# Patient Record
Sex: FEMALE | Race: WHITE
[De-identification: names, ages, dates, MRNs, and addresses within clinical notes are randomized per-mention and may not be internally consistent; named-entity substitution may affect disease eponyms.]

---

## 2017-01-30 ENCOUNTER — HOSPITAL ENCOUNTER (OUTPATIENT)
Dept: HOSPITAL 44 - LAB | Age: 72
End: 2017-01-30
Attending: FAMILY MEDICINE
Payer: MEDICARE

## 2017-01-30 DIAGNOSIS — E03.9: Primary | ICD-10-CM

## 2017-01-30 PROCEDURE — 84443 ASSAY THYROID STIM HORMONE: CPT

## 2017-01-30 PROCEDURE — 36415 COLL VENOUS BLD VENIPUNCTURE: CPT

## 2017-02-17 ENCOUNTER — HOSPITAL ENCOUNTER (OUTPATIENT)
Dept: HOSPITAL 44 - RHEU | Age: 72
End: 2017-02-17
Attending: INTERNAL MEDICINE
Payer: MEDICARE

## 2017-02-17 DIAGNOSIS — M35.00: Primary | ICD-10-CM

## 2017-02-17 PROCEDURE — 99214 OFFICE O/P EST MOD 30 MIN: CPT

## 2017-02-17 NOTE — OP CLINIC PROGRESS NOTE
REFERRING PHYSICIAN:

Dr. Erendira Liang



Dear Dr. Liang:

 

REASON FOR VISIT: 

I had the pleasure of seeing Leena Faust in follow up.  She is not having 
significant discomfort.  She still has some joint pain and stiffness and a rash 
on occasion.  Otherwise, no fever, chills, or sweats.  She denies dry eyes or 
dry mouth.  She has had no rashes.  No numbness or tingling.  



PAST MEDICAL HISTORY:

1.   Myelodysplasia. 

2.   Neutropenia. 

3.   Anemia.  

4.   Chronic low back pain. 

5.   Hypothyroidism. 



PRIMARY CARE PROVIDERS:

She presently is under the care of:

1.    Dr. Pasha Souza. 

2.    Dr. Trace Browne. 

3.    Dr. Jelani Yousif.



PRESENT MEDICATIONS: 

Levothyroxine 75 mcg daily. 



ALLERGIES:

Allergies are again reviewed:

1.    Penicillin. 

2.    Sulfa drugs. 

3.    Erythromycin. 

4.    Vibramycin. 

5.    Tramadol. 

6.    Cefuroxime. 

7.    Allegra. 

8.    Osteo-Bi-Flex.

9.    Alpha-lipoic acid.

10.  Aspirin. 



SOCIAL HISTORY AND FAMILY HISTORY: 

Social history and family history are unchanged. 



PHYSICAL EXAMINATION: 

GENERAL:   She looks well and in no distress. 

VITAL SIGNS:  T: 97.3, R: 12, heart rate of 70, BP: 170/70.

HEENT:   No parotid or submandibular swelling.  No stomatitis or glossitis.  No 
cervical nodes.

LUNGS:  Clear bilaterally. 

HEART:   Regular rhythm.

ABDOMEN:   Soft and nontender. 

VASCULAR:   No edema or cyanosis. 

JOINTS:   DIPs and PIPs with hard bony swelling.  No synovitis at the MCPs, 
wrists, or elbows. 



DIAGNOSTIC STUDIES:

Review of her labs show that LALIT came back at 1:640 in a speckled pattern.  JOSE J
/IgG over 150.  SS-A antibody over 240.  SS-B antibody at 30.  IgM rheumatoid 
factor of 41.  IgA rheumatoid factor of 208.  CCP antibody and MCV antibody 
negative.  Thyroglobulin antibody is equivocal.  Anti-cardiolipin antibodies 
were negative.  



IMPRESSION AND PLAN: 

I suspect this lady has Sjogren's syndrome.  I do not have all the data.  If 
there is a concern that her hemological abnormalities are autoimmune, we could 
consider a steroid trial.   I also will be keeping Dr. Souza, Dr. Browne, and 
Dr. Yousif involved.  



Thank you very much for the opportunity to care for your patient.



Best regards, 





cc:    Dr. Azul Yousif



Enclosure:    

Avise Test



    

MTDD

## 2018-01-05 ENCOUNTER — HOSPITAL ENCOUNTER (OUTPATIENT)
Dept: HOSPITAL 44 - LAB | Age: 73
End: 2018-01-05
Attending: FAMILY MEDICINE
Payer: MEDICARE

## 2018-01-05 DIAGNOSIS — E03.9: Primary | ICD-10-CM

## 2018-01-05 PROCEDURE — 84443 ASSAY THYROID STIM HORMONE: CPT

## 2018-01-05 PROCEDURE — 36415 COLL VENOUS BLD VENIPUNCTURE: CPT

## 2018-12-06 ENCOUNTER — HOSPITAL ENCOUNTER (OUTPATIENT)
Dept: HOSPITAL 44 - LAB | Age: 73
End: 2018-12-06
Attending: FAMILY MEDICINE
Payer: MEDICARE

## 2018-12-06 DIAGNOSIS — E03.9: Primary | ICD-10-CM

## 2018-12-06 PROCEDURE — 36415 COLL VENOUS BLD VENIPUNCTURE: CPT

## 2018-12-06 PROCEDURE — 84443 ASSAY THYROID STIM HORMONE: CPT

## 2019-02-25 ENCOUNTER — HOSPITAL ENCOUNTER (OUTPATIENT)
Dept: HOSPITAL 44 - LAB | Age: 74
End: 2019-02-25
Attending: ORTHOPAEDIC SURGERY
Payer: MEDICARE

## 2019-02-25 DIAGNOSIS — Z96.649: ICD-10-CM

## 2019-02-25 DIAGNOSIS — Z79.01: Primary | ICD-10-CM

## 2019-02-25 DIAGNOSIS — Z96.659: ICD-10-CM

## 2019-02-25 PROCEDURE — 36415 COLL VENOUS BLD VENIPUNCTURE: CPT

## 2019-02-25 PROCEDURE — 85610 PROTHROMBIN TIME: CPT

## 2019-02-28 ENCOUNTER — HOSPITAL ENCOUNTER (OUTPATIENT)
Dept: HOSPITAL 44 - LAB | Age: 74
End: 2019-02-28
Attending: ORTHOPAEDIC SURGERY
Payer: MEDICARE

## 2019-02-28 DIAGNOSIS — Z96.649: ICD-10-CM

## 2019-02-28 DIAGNOSIS — Z96.659: ICD-10-CM

## 2019-02-28 DIAGNOSIS — Z79.01: Primary | ICD-10-CM

## 2019-02-28 PROCEDURE — 85610 PROTHROMBIN TIME: CPT

## 2019-02-28 PROCEDURE — 36415 COLL VENOUS BLD VENIPUNCTURE: CPT

## 2019-03-04 ENCOUNTER — HOSPITAL ENCOUNTER (OUTPATIENT)
Dept: HOSPITAL 44 - LAB | Age: 74
End: 2019-03-04
Attending: ORTHOPAEDIC SURGERY
Payer: MEDICARE

## 2019-03-04 DIAGNOSIS — Z79.01: Primary | ICD-10-CM

## 2019-03-04 DIAGNOSIS — Z96.659: ICD-10-CM

## 2019-03-04 DIAGNOSIS — Z96.649: ICD-10-CM

## 2019-03-04 PROCEDURE — 36415 COLL VENOUS BLD VENIPUNCTURE: CPT

## 2019-03-04 PROCEDURE — 85610 PROTHROMBIN TIME: CPT

## 2019-03-07 ENCOUNTER — HOSPITAL ENCOUNTER (OUTPATIENT)
Dept: HOSPITAL 44 - LAB | Age: 74
End: 2019-03-07
Attending: ORTHOPAEDIC SURGERY
Payer: MEDICARE

## 2019-03-07 DIAGNOSIS — Z96.649: ICD-10-CM

## 2019-03-07 DIAGNOSIS — Z96.659: ICD-10-CM

## 2019-03-07 DIAGNOSIS — Z79.01: Primary | ICD-10-CM

## 2019-03-07 PROCEDURE — 36415 COLL VENOUS BLD VENIPUNCTURE: CPT

## 2019-03-07 PROCEDURE — 85610 PROTHROMBIN TIME: CPT

## 2019-03-11 ENCOUNTER — HOSPITAL ENCOUNTER (OUTPATIENT)
Dept: HOSPITAL 44 - LAB | Age: 74
End: 2019-03-11
Attending: ORTHOPAEDIC SURGERY
Payer: MEDICARE

## 2019-03-11 DIAGNOSIS — Z96.649: ICD-10-CM

## 2019-03-11 DIAGNOSIS — Z79.01: Primary | ICD-10-CM

## 2019-03-11 DIAGNOSIS — Z96.659: ICD-10-CM

## 2019-03-11 PROCEDURE — 36415 COLL VENOUS BLD VENIPUNCTURE: CPT

## 2019-03-11 PROCEDURE — 85610 PROTHROMBIN TIME: CPT

## 2019-03-14 ENCOUNTER — HOSPITAL ENCOUNTER (OUTPATIENT)
Dept: HOSPITAL 44 - LAB | Age: 74
End: 2019-03-14
Attending: ORTHOPAEDIC SURGERY
Payer: MEDICARE

## 2019-03-14 DIAGNOSIS — Z96.649: ICD-10-CM

## 2019-03-14 DIAGNOSIS — Z96.659: ICD-10-CM

## 2019-03-14 DIAGNOSIS — Z79.01: Primary | ICD-10-CM

## 2019-03-14 PROCEDURE — 85610 PROTHROMBIN TIME: CPT

## 2019-03-14 PROCEDURE — 36415 COLL VENOUS BLD VENIPUNCTURE: CPT

## 2019-03-18 ENCOUNTER — HOSPITAL ENCOUNTER (OUTPATIENT)
Dept: HOSPITAL 44 - LAB | Age: 74
End: 2019-03-18
Attending: ORTHOPAEDIC SURGERY
Payer: MEDICARE

## 2019-03-18 DIAGNOSIS — Z96.649: ICD-10-CM

## 2019-03-18 DIAGNOSIS — Z96.659: ICD-10-CM

## 2019-03-18 DIAGNOSIS — Z79.01: Primary | ICD-10-CM

## 2019-03-18 PROCEDURE — 85610 PROTHROMBIN TIME: CPT

## 2019-03-18 PROCEDURE — 36415 COLL VENOUS BLD VENIPUNCTURE: CPT

## 2019-03-21 ENCOUNTER — HOSPITAL ENCOUNTER (OUTPATIENT)
Dept: HOSPITAL 44 - LAB | Age: 74
End: 2019-03-21
Attending: ORTHOPAEDIC SURGERY
Payer: MEDICARE

## 2019-03-21 DIAGNOSIS — Z79.01: Primary | ICD-10-CM

## 2019-03-21 DIAGNOSIS — Z96.649: ICD-10-CM

## 2019-03-21 DIAGNOSIS — Z96.659: ICD-10-CM

## 2019-03-21 PROCEDURE — 36415 COLL VENOUS BLD VENIPUNCTURE: CPT

## 2019-03-21 PROCEDURE — 85610 PROTHROMBIN TIME: CPT

## 2019-03-25 ENCOUNTER — HOSPITAL ENCOUNTER (OUTPATIENT)
Dept: HOSPITAL 44 - LAB | Age: 74
End: 2019-03-25
Attending: ORTHOPAEDIC SURGERY
Payer: MEDICARE

## 2019-03-25 DIAGNOSIS — Z96.649: ICD-10-CM

## 2019-03-25 DIAGNOSIS — Z79.01: Primary | ICD-10-CM

## 2019-03-25 DIAGNOSIS — Z96.659: ICD-10-CM

## 2019-03-25 PROCEDURE — 36415 COLL VENOUS BLD VENIPUNCTURE: CPT

## 2019-03-25 PROCEDURE — 85610 PROTHROMBIN TIME: CPT

## 2019-10-21 ENCOUNTER — HOSPITAL ENCOUNTER (OUTPATIENT)
Dept: HOSPITAL 44 - LAB | Age: 74
End: 2019-10-21
Attending: FAMILY MEDICINE
Payer: MEDICARE

## 2019-10-21 DIAGNOSIS — E03.9: ICD-10-CM

## 2019-10-21 DIAGNOSIS — D59.1: Primary | ICD-10-CM

## 2019-10-21 LAB
BASOPHILS NFR BLD: 0.3 % (ref 0–1.5)
EGFR (NON-AFRICAN): > 60
MCV RBC AUTO: 96 FL (ref 80–100)
NEUTROPHILS #: 0.9 # K/UL (ref 1.4–7.7)
TSH: 4.95 MIU/L (ref 0.47–4.68)

## 2019-10-21 PROCEDURE — 82607 VITAMIN B-12: CPT

## 2019-10-21 PROCEDURE — 36415 COLL VENOUS BLD VENIPUNCTURE: CPT

## 2019-10-21 PROCEDURE — 84443 ASSAY THYROID STIM HORMONE: CPT

## 2019-10-21 PROCEDURE — 85025 COMPLETE CBC W/AUTO DIFF WBC: CPT

## 2019-10-21 PROCEDURE — 85610 PROTHROMBIN TIME: CPT

## 2019-10-21 PROCEDURE — 80053 COMPREHEN METABOLIC PANEL: CPT

## 2019-12-17 ENCOUNTER — HOSPITAL ENCOUNTER (OUTPATIENT)
Dept: HOSPITAL 44 - LAB | Age: 74
End: 2019-12-17
Attending: FAMILY MEDICINE
Payer: MEDICARE

## 2019-12-17 DIAGNOSIS — D59.1: Primary | ICD-10-CM

## 2019-12-17 DIAGNOSIS — E03.9: ICD-10-CM

## 2019-12-17 LAB
BASOPHILS NFR BLD: 0.3 % (ref 0–1.5)
MCV RBC AUTO: 99 FL (ref 80–100)
NEUTROPHILS #: 3.5 # K/UL (ref 1.4–7.7)

## 2019-12-17 PROCEDURE — 82607 VITAMIN B-12: CPT

## 2019-12-17 PROCEDURE — 36415 COLL VENOUS BLD VENIPUNCTURE: CPT

## 2019-12-17 PROCEDURE — 84443 ASSAY THYROID STIM HORMONE: CPT

## 2019-12-17 PROCEDURE — 85025 COMPLETE CBC W/AUTO DIFF WBC: CPT
